# Patient Record
Sex: FEMALE | Race: WHITE
[De-identification: names, ages, dates, MRNs, and addresses within clinical notes are randomized per-mention and may not be internally consistent; named-entity substitution may affect disease eponyms.]

---

## 2017-02-13 ENCOUNTER — HOSPITAL ENCOUNTER (OUTPATIENT)
Dept: HOSPITAL 39 - MAMMO | Age: 47
Discharge: HOME | End: 2017-02-13
Payer: SELF-PAY

## 2017-02-13 DIAGNOSIS — Z12.31: Primary | ICD-10-CM

## 2017-02-13 NOTE — MAM
EXAM DESCRIPTION:

MAMMO BREAST SCREENING BILATERAL

CAD, images were reviewed with CAD technology, R2 computer-aided

detection.



CLINICAL HISTORY:

[facility].



COMPARISON:

2010.



FINDINGS:

Routine views are obtained.  Glandular tissue is near completely fatty

involuted. No dominant mass, architectural distortion or clustered

microcalcification..



IMPRESSION:

Benign exam.



BIRAD CATEGORY: 2 BENIGN



RECOMMENDATIONS:

FOLLOW-UP:  Routine screening mammogram in one year.



According to the American College [employer], yearly mammograms are

recommended starting at age 40 and continuing as long as a woman is in

good health.  Any breast change noted on a breast self-exam should be

reported promptly to the patient's healthcare provider.  Breast MRI is

recommended for women with an approximately 20-25% or greater lifetime

risk of breast cancer, including women with a strong family history of

breast or ovarian cancer and women who have been treated for Hodgkin's

disease.



Electronically signed by: Emily Cabral  02/13/2017 15:21

## 2017-05-28 ENCOUNTER — HOSPITAL ENCOUNTER (EMERGENCY)
Dept: HOSPITAL 39 - ER | Age: 47
Discharge: HOME | End: 2017-05-28
Payer: SELF-PAY

## 2017-05-28 VITALS — TEMPERATURE: 97.7 F

## 2017-05-28 VITALS — OXYGEN SATURATION: 97 % | SYSTOLIC BLOOD PRESSURE: 111 MMHG | DIASTOLIC BLOOD PRESSURE: 71 MMHG

## 2017-05-28 DIAGNOSIS — K21.9: ICD-10-CM

## 2017-05-28 DIAGNOSIS — K29.70: Primary | ICD-10-CM

## 2017-05-28 PROCEDURE — 81025 URINE PREGNANCY TEST: CPT

## 2017-05-28 PROCEDURE — 83690 ASSAY OF LIPASE: CPT

## 2017-05-28 PROCEDURE — 82550 ASSAY OF CK (CPK): CPT

## 2017-05-28 PROCEDURE — 81001 URINALYSIS AUTO W/SCOPE: CPT

## 2017-05-28 PROCEDURE — 85730 THROMBOPLASTIN TIME PARTIAL: CPT

## 2017-05-28 PROCEDURE — 85025 COMPLETE CBC W/AUTO DIFF WBC: CPT

## 2017-05-28 PROCEDURE — 36415 COLL VENOUS BLD VENIPUNCTURE: CPT

## 2017-05-28 PROCEDURE — 82150 ASSAY OF AMYLASE: CPT

## 2017-05-28 PROCEDURE — 80053 COMPREHEN METABOLIC PANEL: CPT

## 2017-05-28 PROCEDURE — 85610 PROTHROMBIN TIME: CPT

## 2017-05-28 PROCEDURE — 74177 CT ABD & PELVIS W/CONTRAST: CPT

## 2017-05-28 PROCEDURE — 84484 ASSAY OF TROPONIN QUANT: CPT

## 2017-05-28 PROCEDURE — 74020: CPT

## 2017-05-28 PROCEDURE — 82553 CREATINE MB FRACTION: CPT

## 2017-05-28 RX ADMIN — PANTOPRAZOLE SODIUM ONE MG: 40 INJECTION, POWDER, FOR SOLUTION INTRAVENOUS at 12:53

## 2017-05-28 RX ADMIN — KETOROLAC TROMETHAMINE ONE MG: 30 INJECTION, SOLUTION INTRAMUSCULAR at 12:06

## 2017-05-28 RX ADMIN — Medication ONE MLS/HR: at 11:59

## 2017-05-28 RX ADMIN — SUCRALFATE ONE GM: 1 TABLET ORAL at 12:48

## 2017-05-28 RX ADMIN — HYDROMORPHONE HYDROCHLORIDE ONE MG: 2 INJECTION, SOLUTION INTRAMUSCULAR; INTRAVENOUS; SUBCUTANEOUS at 11:47

## 2017-05-28 RX ADMIN — ONDANSETRON ONE MG: 2 INJECTION INTRAMUSCULAR; INTRAVENOUS at 12:06

## 2017-05-28 RX ADMIN — PROCHLORPERAZINE EDISYLATE ONE MG: 5 INJECTION INTRAMUSCULAR; INTRAVENOUS at 13:38

## 2017-05-28 RX ADMIN — LIDOCAINE HYDROCHLORIDE ONE ML: 20 SOLUTION ORAL; TOPICAL at 11:26

## 2017-05-28 NOTE — CT
EXAM DESCRIPTION: 



Abdomen/Pelvis w/Contrast



CLINICAL HISTORY: 



47 years Female, epigastric pain, elevated amylase/lipase



COMPARISON: 



Abdominal radiographs from today.



TECHNIQUE: 



5 mm axial images through the abdomen and pelvis were performed

after the administration of intravenous and oral contrast.

Coronal and sagittal reconstructions were obtained.  This exam

was performed according to our departmental dose-optimization

program which includes use of Automated Exposure Control,

adjustment of the mA and/or kV according to patient size and/or

use of iterative reconstruction technique.



FINDINGS: 



Aside from atelectasis, the lung bases are clear. No pericardial

or pleural effusion. Periportal edema is noted. The IVC is

well-distended. A 2.1 cm focus of hypoattenuation involves the

caudal LEFT lobe of the liver (axial image 27). Delayed images

are not available. Postcontrast images of the spleen, kidneys

(small upper pole LEFT renal calculus, no hydronephrosis),

adrenal glands and pancreas are unremarkable. There has been a

previous cholecystectomy. No biliary ductal dilatation. No

ascites or small bowel obstruction. Noninflamed appendix. No

bowel wall thickening or pericolonic fat stranding. Oral contrast

remains within the stomach and has not yet passed into loops of

small bowel. Dominant LEFT ovarian follicles measure up to 2 cm

in diameter. No free fluid in the pelvis. No bladder calculus.



IMPRESSION: 



No CT evidence of acute pancreatitis. No evidence of biliary

obstruction.



2.1 cm focus of hypoattenuation in the caudal LEFT lobe of the

liver. This could represent a perfusion phenomenon but delayed

images are not available for further evaluation. Recommend

nonemergent MRI versus nonemergent three-phase liver mass

protocol CT if clinically indicated.



Periportal edema is not unusual in younger patients that are well

hydrated.



Nonobstructing small LEFT renal calculus.



Electronically signed by:  Tatiana Calvin MD  5/28/2017 3:25 PM CDT

Workstation: [id number]

## 2017-05-28 NOTE — RAD
EXAM DESCRIPTION: 



Abdomen Series



CLINICAL HISTORY: 



47 years, Female, epigastric pain, recurrent



COMPARISON: 



None.



FINDINGS: 



A frontal chest radiograph along with upright and supine

radiograph the abdomen and pelvis were performed. The lungs are

well expanded and clear. The costophrenic sulci are sharp. No

pneumoperitoneum is present. There is no gaseous distention of

the bowel. Bowel gas is patchy in appearance and several

air-fluid levels are noted. Multiple calcifications project over

the mid abdomen, LEFT lower quadrant and pelvis and likely

resides within the colon. Surgical clips in the RIGHT upper

quadrant suggest previous scoliosis ectomy. Old RIGHT L5

transverse process tip fracture.



IMPRESSION: 



Nonspecific bowel gas pattern without evidence of venecia

obstruction. CT imaging could be obtained for further evaluation

if clinically indicated.



No acute cardiopulmonary disease.



Electronically signed by:  Tatiana Calvin MD  5/28/2017 11:49 AM

CDT Workstation: [id number]

## 2017-05-28 NOTE — CT
EXAM DESCRIPTION: 



Abdomen/Pelvis w/Contrast



CLINICAL HISTORY: 



47 years Female, epigastric pain, elevated amylase/lipase



COMPARISON: 



Abdominal radiographs from today.



TECHNIQUE: 



5 mm axial images through the abdomen and pelvis were performed

after the administration of intravenous and oral contrast.

Coronal and sagittal reconstructions were obtained.  This exam

was performed according to our departmental dose-optimization

program which includes use of Automated Exposure Control,

adjustment of the mA and/or kV according to patient size and/or

use of iterative reconstruction technique.



FINDINGS: 



Aside from atelectasis, the lung bases are clear. No pericardial

or pleural effusion. Periportal edema is noted. The IVC is

well-distended. A 2.1 cm focus of hypoattenuation involves the

caudal LEFT lobe of the liver (axial image 27). Delayed images

are not available. Postcontrast images of the spleen, kidneys

(small upper pole LEFT renal calculus, no hydronephrosis),

adrenal glands and pancreas are unremarkable. There has been a

previous cholecystectomy. No biliary ductal dilatation. No

ascites or small bowel obstruction. Noninflamed appendix. No

bowel wall thickening or pericolonic fat stranding. Oral contrast

remains within the stomach and has not yet passed into loops of

small bowel. Dominant LEFT ovarian follicles measure up to 2 cm

in diameter. No free fluid in the pelvis. No bladder calculus.



IMPRESSION: 



No CT evidence of acute pancreatitis. No evidence of biliary

obstruction.



2.1 cm focus of hypoattenuation in the caudal LEFT lobe of the

liver. This could represent a perfusion phenomenon but delayed

images are not available for further evaluation. Recommend

nonemergent MRI versus nonemergent three-phase liver mass

protocol CT if clinically indicated.



Periportal edema is not unusual in younger patients that are well

hydrated.



Nonobstructing small LEFT renal calculus.



Electronically signed by:  Tatiana Calvin MD  5/28/2017 3:25 PM CDT

Workstation: [id number]

## 2017-05-28 NOTE — ED.PDOC
History of Present Illness





- General


Chief Complaint: Abdominal Pain


Time Seen by Provider: 05/28/17 11:18


Source: patient, family


Exam Limitations: no limitations





- History of Present Illness


Initial Comments: 





The patient is a 47-year-old  female presenting secondary to acute 

onset epigastric pain severe in nature for the last half hour.  Pain is 

cramping and burning.  Mild nausea.  She does get similar pain most morning  

But not this severe.  No fever.  No blood in the stool.  She has been passing 

gas and having  bowel movements.  She does have a clinical history consistent 

with some esophageal spasm.  She is not currently taking any acid reducing 

medications


Timing/Duration: 1/2 hour


Severity: severe


Improving Factors: nothing


Worsening Factors: nothing


Associated Symptoms: loss of appetite, malaise, nausea/vomiting


Allergies/Adverse Reactions: 


Allergies





NO KNOWN ALLERGY Allergy (Verified 05/28/17 11:40)


 








Home Medications: 


Ambulatory Orders





Esomeprazole Magnesium [Nexium] 40 mg PO BID #60 cap 05/28/17 


Ondansetron [Zofran Odt] 4 mg PO Q4H PRN #10 tab 05/28/17 


Sucralfate Tab [Carafate Tab] 1 gm PO QID #120 tab 05/28/17 











Review of Systems





- Review of Systems


Constitutional: States: no symptoms reported


EENTM: States: no symptoms reported


Respiratory: States: no symptoms reported


Cardiology: States: no symptoms reported


Gastrointestinal/Abdominal: States: see HPI


Genitourinary: States: see HPI


Musculoskeletal: States: no symptoms reported


Skin: States: no symptoms reported


Neurological: States: no symptoms reported


Endocrine: States: no symptoms reported


All other Systems: No Change from Baseline





Past Medical History (General)





- Patient Medical History


Hx Stroke: No


Hx Asthma: No


Hx Cardiac Disorders: No


Hx Hypertension: No


Hx Diabetes: No


Hx Gastroesophageal Reflux: Yes


Hx Renal Disease: No





- Female History


Patient is a Female of Child Bearing Age (10 -59 yrs old): Yes


Patient Pregnant: No





Family Medical History





- Family History


  ** Mother


Family History: Unknown





Physical Exam





- Physical Exam


General Appearance: Alert, Obvious distress


Eye Exam: bilateral normal


Ears, Nose, Throat: normal ENT inspection, normal pharynx


Neck: non-tender, full range of motion, supple


Respiratory: chest non-tender, lungs clear, normal breath sounds, no 

respiratory distress, no accessory muscle use


Cardiovascular/Chest: normal peripheral pulses, regular rate, rhythm, no edema


Peripheral Pulses: radial,right: 2+, radial,left: 2+, dorsalis pedis,right: 2+, 

dorsalis pedis,left: 2+, posterior tibialis,right: 2+, posterior tibialis,left: 

2+


Gastrointestinal/Abdominal: no pulsatile mass, other - epigastric discomfort to 

palpation is present.  No definite rebound or peritoneal sign.  No palpable 

enlargement of the abdominal aorta


Rectal Exam: deferred


Back Exam: normal inspection, no CVA tenderness, no vertebral tenderness


Extremity: normal range of motion, non-tender, normal inspection, no pedal edema

, normal capillary refill


Neurologic: alert, normal mood/affect, oriented x 3


Skin Exam: normal color


Comments: 





 Vital Signs - 24 hr











  05/28/17 05/28/17 05/28/17





  11:21 12:25 13:25


 


Temperature 97.7 F  


 


Pulse Rate [ 73 64 53 L





left brachial]   


 


Respiratory 20 20 16





Rate   


 


Blood Pressure 141/76 111/67 166/69





[left brachial]   


 


O2 Sat by Pulse 100 99 97





Oximetry   














  05/28/17





  14:40


 


Temperature 


 


Pulse Rate [ 50 L





left brachial] 


 


Respiratory 14





Rate 


 


Blood Pressure 113/72





[left brachial] 


 


O2 Sat by Pulse 96





Oximetry 














Progress





- Progress


Progress: 





05/28/17 15:43


the patient is a 47-year-old female presenting secondary to severe epigastric  

Pain.  This is most consistent with gastritis and a history of some esophageal 

spasm.  The patient will be placed on Carafate 1 g 4 times a day for one month 

and Nexium 40 milligrams twice daily for 1 month.  after that she can take 

Pepcid 20 mg daily.  She needs to keep liquid Maalox with her for any acute 

flares.  Additionally she will be written for Zofran for as needed use.  For 

the next few days she needs to maintain a liquid diet.  she needs to avoid 

caffeine, nicotine, hot or spicy foods.  ER warnings were given for any acute 

worsening.  In the next few months, she needs to obtain an MRI of the liver for 

reevaluation of a spot seen on the CT scan. the patient has improved 

clinically.  She needs to follow up with her primary care doctor next week.





- Results/Orders


Results/Orders: 





 Laboratory Tests











  05/28/17 05/28/17 05/28/17





  11:58 11:58 11:58


 


WBC   5.7 


 


RBC   4.52 


 


Hgb   14.1 


 


Hct   42.3 


 


MCV   93.6 


 


MCH   31.2 H 


 


MCHC   33.3 


 


RDW   14.1 


 


Plt Count   243 


 


MPV   8.0 


 


Absolute Neuts (auto)   2.10 


 


Absolute Lymphs (auto)   2.40 


 


Absolute Monos (auto)   0.40 


 


Absolute Eos (auto)   0.70 H 


 


Absolute Basos (auto)   0.10 


 


Neutrophils %   37.5 L 


 


Lymphocytes %   41.5 


 


Monocytes %   7.5 


 


Eosinophils %   12.3 H 


 


Basophils %   1.2 


 


PT    10.9


 


INR    0.960


 


PTT (SP)    28.0


 


Sodium  139  


 


Potassium  4.1  


 


Chloride  105  


 


Carbon Dioxide  27  


 


Anion Gap  11.1 L  


 


BUN  18  


 


Creatinine  0.85  


 


BUN/Creatinine Ratio  21.2 H  


 


Random Glucose  123 H  


 


Serum Osmolality  280.8  


 


Calcium  9.2  


 


Total Bilirubin  0.8  


 


AST  64 H  


 


ALT  35  


 


Alkaline Phosphatase  77  


 


Creatine Kinase  187 H  


 


CK-MB (CK-2)  4.9 H*  


 


CK-MB (CK-2) %  2.62  


 


Troponin I  < 0.02  


 


Serum Total Protein  7.3  


 


Albumin  3.9  


 


Globulin  3.4  


 


Albumin/Globulin Ratio  1.1  


 


Amylase  534 H*  


 


Lipase  926 H  


 


Urine Color   


 


Urine Appearance   


 


Urine pH   


 


Ur Specific Gravity   


 


Urine Protein   


 


Urine Glucose (UA)   


 


Urine Ketones   


 


Urine Blood   


 


Urine Nitrite   


 


Urine Bilirubin   


 


Urine Urobilinogen   


 


Ur Leukocyte Esterase   


 


Urine RBC   


 


Urine WBC   


 


Ur Epithelial Cells   


 


Amorphous Sediment   


 


Urine Bacteria   


 


Urine HCG, Qual   














  05/28/17 05/28/17





  12:28 12:37


 


WBC  


 


RBC  


 


Hgb  


 


Hct  


 


MCV  


 


MCH  


 


MCHC  


 


RDW  


 


Plt Count  


 


MPV  


 


Absolute Neuts (auto)  


 


Absolute Lymphs (auto)  


 


Absolute Monos (auto)  


 


Absolute Eos (auto)  


 


Absolute Basos (auto)  


 


Neutrophils %  


 


Lymphocytes %  


 


Monocytes %  


 


Eosinophils %  


 


Basophils %  


 


PT  


 


INR  


 


PTT (SP)  


 


Sodium  


 


Potassium  


 


Chloride  


 


Carbon Dioxide  


 


Anion Gap  


 


BUN  


 


Creatinine  


 


BUN/Creatinine Ratio  


 


Random Glucose  


 


Serum Osmolality  


 


Calcium  


 


Total Bilirubin  


 


AST  


 


ALT  


 


Alkaline Phosphatase  


 


Creatine Kinase  


 


CK-MB (CK-2)  


 


CK-MB (CK-2) %  


 


Troponin I  


 


Serum Total Protein  


 


Albumin  


 


Globulin  


 


Albumin/Globulin Ratio  


 


Amylase  


 


Lipase  


 


Urine Color   Yellow


 


Urine Appearance   Cloudy


 


Urine pH   >= 9.0 H*


 


Ur Specific Gravity   1.015


 


Urine Protein   30


 


Urine Glucose (UA)   Negative


 


Urine Ketones   15 H


 


Urine Blood   Negative


 


Urine Nitrite   Negative


 


Urine Bilirubin   Negative


 


Urine Urobilinogen   4.0 H


 


Ur Leukocyte Esterase   Negative


 


Urine RBC   0


 


Urine WBC   0


 


Ur Epithelial Cells   3-5


 


Amorphous Sediment   4+


 


Urine Bacteria   0


 


Urine HCG, Qual  Negative 








CT scan of the abdomen and pelvis showed no evidence of any bowel perforation 

or obstruction.  No evidence of bowel ischemia.  no evidence of pancreatitis.  

She does have a 1 inch spot on her liver that needs to be followed up with 

additional imaging in the near future.


Acute abdominal series is nonspecific.  No evidence of free air. No evidence of 

constipation.





Departure





- Departure


Clinical Impression: 


 Gastritis





Disposition: Discharge to Home or Self Care


Condition: Fair


Departure Forms:  ED Discharge - Pt. Copy, Patient Portal Self Enrollment


Instructions:  DI for Gastritis, Achalasia


Diet: bland diet


Activity: increase activity as tolerated


Referrals: 


Anoop Sanchez MD [Primary Care Provider] - 1-2 Weeks


Prescriptions: 


Esomeprazole Magnesium [Nexium] 40 mg PO BID #60 cap


Ondansetron [Zofran Odt] 4 mg PO Q4H PRN #10 tab


 PRN Reason: Vomiting


Sucralfate Tab [Carafate Tab] 1 gm PO QID #120 tab


Home Medications: 


Ambulatory Orders





Esomeprazole Magnesium [Nexium] 40 mg PO BID #60 cap 05/28/17 


Ondansetron [Zofran Odt] 4 mg PO Q4H PRN #10 tab 05/28/17 


Sucralfate Tab [Carafate Tab] 1 gm PO QID #120 tab 05/28/17 








Additional Instructions: 


the patient is a 47-year-old female presenting secondary to severe epigastric  

Pain.  This is most consistent with gastritis and a history of some esophageal 

spasm.  The patient will be placed on Carafate 1 g 4 times a day for one month 

and Nexium 40 milligrams twice daily for 1 month.  after that she can take 

Pepcid 20 mg daily.  She needs to keep liquid Maalox with her for any acute 

flares.  Additionally she will be written for Zofran for as needed use.  For 

the next few days she needs to maintain a liquid diet.  she needs to avoid 

caffeine, nicotine, hot or spicy foods.  ER warnings were given for any acute 

worsening.  In the next few months, she needs to obtain an MRI of the liver for 

reevaluation of a spot seen on the CT scan. the patient has improved 

clinically.  She needs to follow up with her primary care doctor next week.

## 2020-11-09 ENCOUNTER — HOSPITAL ENCOUNTER (OUTPATIENT)
Dept: HOSPITAL 39 - MAMMO | Age: 50
End: 2020-11-09
Payer: SELF-PAY

## 2020-11-09 DIAGNOSIS — Z12.31: Primary | ICD-10-CM

## 2020-11-11 NOTE — MAM
EXAM DESCRIPTION: 

3D Screening BILATERAL : Digital Mammography.



CLINICAL HISTORY: 

50 years Female SCREENING . No complaints. No personal or family

history of breast cancer. Menarche age 11. Childbirth age 26.

Premenopausal. No HRT. Lifetime risk of developing breast cancer

(Tyrer-Cuzick model)(%):  9.7.



COMPARISON: 

Bilateral screening digital breast 2-D imaging.  February 2017

and February 2013.  



TECHNIQUE: 

Bilateral CC and MLO projection full-field images, digital

tomosynthesis mammographic technique. Bilateral digital 2-D

full-field MLO images.  CAD available for 2-D images.  



FINDINGS: 

The breast parenchymal density pattern is: Scattered areas of

fibroglandular density. No skin thickening or nipple retraction. 

Solitary microcalcifications. Intramammary lymph nodes.

No new focal, stellate mass or density, focal asymmetry , and no

suspicious microcalcifications bilaterally. Stable mammograms

compared to prior study.  Taking into account, differences in

mammographic technique.



IMPRESSION: 

Benign exam.



BIRAD CATEGORY: 2 BENIGN FINDINGS.



RECOMMENDATIONS:

FOLLOW UP: Routine digital bilateral  mammographic screening, one

year interval from  November 2020.



Written communication explaining the IMPRESSION and follow-up,

will be mailed to the patient and referring health care provider.



According to the American College of [employer], yearly mammograms

are recommended starting at age 40 and continuing as long as a

woman is in good health.  Any breast change noted on a breast

self-exam should be reported promptly to the patient's healthcare

provider.  Breast MRI is recommended for women with an

approximately 20-25% or greater lifetime risk of breast cancer,

including women with a strong family history of breast or ovarian

cancer and women who have been treated for Hodgkin's disease.  A

negative mammographic report should not delay tissue diagnosis in

patients with significant clinical history or physical findings. 

Extremely dense breast tissue limits the sensitivity of digital

mammography. 





Electronically signed by:  Kirill Qiu MD  11/11/2020 3:43 PM

[facility] Workstation: [id number]